# Patient Record
Sex: FEMALE | Race: WHITE | URBAN - NONMETROPOLITAN AREA
[De-identification: names, ages, dates, MRNs, and addresses within clinical notes are randomized per-mention and may not be internally consistent; named-entity substitution may affect disease eponyms.]

---

## 2023-03-18 LAB
ACTIVATED PARTIAL THROMBOPLASTIN TIME IN PPP BY COAGULATION ASSAY: 24 SEC (ref 26–39)
CREATINE KINASE (U/L) IN SER/PLAS: NORMAL
CREATINE KINASE MB/CREATINE KINASE TOTAL BY CALCULATION: NORMAL
CREATINE KINASE-MB (NG/ML) IN SER/PLAS: NORMAL
D-DIMER, QUANTITATIVE VTE EXCLUSION: 2655 NG/ML FEU
INR IN PPP BY COAGULATION ASSAY: 1.1 (ref 0.9–1.1)
MYOGLOBIN (UG/L) IN SER/PLAS: NORMAL
PROTHROMBIN TIME (PT) IN PPP BY COAGULATION ASSAY: 13.1 SEC (ref 9.8–13.4)
TROPONIN I, HIGH SENSITIVITY: NORMAL

## 2024-01-01 ENCOUNTER — TELEPHONE (OUTPATIENT)
Dept: PRIMARY CARE | Facility: CLINIC | Age: 84
End: 2024-01-01

## 2024-02-01 NOTE — TELEPHONE ENCOUNTER
Dr. [facility] called and said her bp is running lower 90/62 today everything else is good. 50mg bid bp meds will you lower it to help keep her bp up?  Call Sharron with [facility] with answer [phone]

## 2024-05-31 PROBLEM — L57.0 ACTINIC KERATOSES: Status: RESOLVED | Noted: 2018-10-08 | Resolved: 2024-05-31

## 2024-05-31 PROBLEM — B02.29 POST HERPETIC NEURALGIA: Status: ACTIVE | Noted: 2024-05-31

## 2024-05-31 PROBLEM — N18.30 CKD (CHRONIC KIDNEY DISEASE) STAGE 3, GFR 30-59 ML/MIN (MULTI): Status: ACTIVE | Noted: 2024-05-31

## 2024-05-31 PROBLEM — C44.320 SQUAMOUS CELL CARCINOMA OF SKIN OF UNSPECIFIED PARTS OF FACE: Status: RESOLVED | Noted: 2018-10-08 | Resolved: 2024-05-31

## 2024-05-31 PROBLEM — R01.1 HEART MURMUR: Status: RESOLVED | Noted: 2024-05-31 | Resolved: 2024-05-31

## 2024-05-31 PROBLEM — M19.90 ARTHRITIS: Status: RESOLVED | Noted: 2024-05-31 | Resolved: 2024-05-31

## 2024-05-31 PROBLEM — F32.A DEPRESSION: Status: ACTIVE | Noted: 2024-05-31

## 2024-05-31 PROBLEM — I10 HYPERTENSION: Status: ACTIVE | Noted: 2024-05-31
